# Patient Record
Sex: FEMALE | Race: BLACK OR AFRICAN AMERICAN
[De-identification: names, ages, dates, MRNs, and addresses within clinical notes are randomized per-mention and may not be internally consistent; named-entity substitution may affect disease eponyms.]

---

## 2020-08-27 ENCOUNTER — HOSPITAL ENCOUNTER (OUTPATIENT)
Dept: HOSPITAL 63 - DXRAD | Age: 50
Discharge: HOME | End: 2020-08-27
Payer: COMMERCIAL

## 2020-08-27 DIAGNOSIS — M25.561: ICD-10-CM

## 2020-08-27 DIAGNOSIS — M54.5: Primary | ICD-10-CM

## 2020-08-27 PROCEDURE — 73560 X-RAY EXAM OF KNEE 1 OR 2: CPT

## 2020-08-27 PROCEDURE — 72100 X-RAY EXAM L-S SPINE 2/3 VWS: CPT

## 2020-08-27 NOTE — RAD
EXAM: Lumbar spine, 3 views.

 

HISTORY: Pain.

 

COMPARISON: None.

 

FINDINGS: 3 views of the lumbar spine are obtained. There is no listhesis.

The vertebral bodies are normal in height. The disc spaces are preserved.

 

IMPRESSION: No acute osseous finding.

 

Electronically signed by: Court Clarke MD (8/27/2020 10:27 AM) [facility]

## 2020-08-27 NOTE — RAD
EXAM: Right knee, 2 views.

 

HISTORY: Pain.

 

COMPARISON: None.

 

FINDINGS: 2 views the right knee are obtained. There is no fracture, 

dislocation or subluxation. There is no effusion.

 

IMPRESSION: No acute osseous finding.

 

Electronically signed by: Court Clarke MD (8/27/2020 10:27 AM) [facility]

## 2022-05-08 ENCOUNTER — HOSPITAL ENCOUNTER (EMERGENCY)
Dept: HOSPITAL 63 - ER | Age: 52
Discharge: HOME | End: 2022-05-08
Payer: COMMERCIAL

## 2022-05-08 VITALS — BODY MASS INDEX: 31.05 KG/M2 | HEIGHT: 63 IN | WEIGHT: 175.27 LBS

## 2022-05-08 DIAGNOSIS — M19.90: ICD-10-CM

## 2022-05-08 DIAGNOSIS — Z88.8: ICD-10-CM
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
## 2022-05-08 DIAGNOSIS — R51.9: ICD-10-CM

## 2022-05-08 DIAGNOSIS — S80.212A: Primary | ICD-10-CM
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
## 2022-05-08 DIAGNOSIS — Z88.5: ICD-10-CM

## 2022-05-08 DIAGNOSIS — M79.7: ICD-10-CM

## 2022-05-08 DIAGNOSIS — Z88.1: ICD-10-CM

## 2022-05-08 DIAGNOSIS — I10: ICD-10-CM

## 2022-05-08 DIAGNOSIS — R07.2: ICD-10-CM

## 2022-05-08 DIAGNOSIS — M54.2: ICD-10-CM
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
## 2022-05-08 DIAGNOSIS — Z88.2: ICD-10-CM
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
## 2022-05-08 PROCEDURE — 99284 EMERGENCY DEPT VISIT MOD MDM: CPT

## 2022-05-08 PROCEDURE — 71045 X-RAY EXAM CHEST 1 VIEW: CPT

## 2022-05-08 PROCEDURE — 73562 X-RAY EXAM OF KNEE 3: CPT

## 2022-05-08 PROCEDURE — 70450 CT HEAD/BRAIN W/O DYE: CPT

## 2022-05-08 PROCEDURE — 72125 CT NECK SPINE W/O DYE: CPT
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
